# Patient Record
Sex: FEMALE | Race: WHITE | URBAN - METROPOLITAN AREA
[De-identification: names, ages, dates, MRNs, and addresses within clinical notes are randomized per-mention and may not be internally consistent; named-entity substitution may affect disease eponyms.]

---

## 2017-01-23 ENCOUNTER — TELEPHONE (OUTPATIENT)
Dept: FAMILY MEDICINE CLINIC | Facility: CLINIC | Age: 42
End: 2017-01-23

## 2017-01-31 NOTE — TELEPHONE ENCOUNTER
Patient states she has a question regarding her father Delaney Baeza) and would like to know why the cause of death was cerebral hemorrhage?   Please advise

## 2017-01-31 NOTE — TELEPHONE ENCOUNTER
Yana informed that the diagnosis of Nontraumatic intracerebral hemorrhage of cerebellum was used to enter the patient into hospice due to his decline and therefore used as cause of death.     Yana thanked me for my time to look into the information and gi

## 2017-03-27 ENCOUNTER — OFFICE VISIT (OUTPATIENT)
Dept: FAMILY MEDICINE CLINIC | Facility: CLINIC | Age: 42
End: 2017-03-27

## 2017-03-27 VITALS
RESPIRATION RATE: 12 BRPM | WEIGHT: 177 LBS | HEIGHT: 65.5 IN | BODY MASS INDEX: 29.14 KG/M2 | HEART RATE: 60 BPM | DIASTOLIC BLOOD PRESSURE: 70 MMHG | SYSTOLIC BLOOD PRESSURE: 110 MMHG | TEMPERATURE: 99 F

## 2017-03-27 DIAGNOSIS — N30.01 ACUTE CYSTITIS WITH HEMATURIA: Primary | ICD-10-CM

## 2017-03-27 LAB
BILIRUBIN: NEGATIVE
GLUCOSE (URINE DIPSTICK): NEGATIVE MG/DL
KETONES (URINE DIPSTICK): NEGATIVE MG/DL
MULTISTIX LOT#: NORMAL NUMERIC
NITRITE, URINE: NEGATIVE
PH, URINE: 5.5 (ref 4.5–8)
SPECIFIC GRAVITY: 1.02 (ref 1–1.03)
URINE-COLOR: YELLOW
UROBILINOGEN,SEMI-QN: 0.2 MG/DL (ref 0–1.9)

## 2017-03-27 PROCEDURE — 87186 SC STD MICRODIL/AGAR DIL: CPT | Performed by: NURSE PRACTITIONER

## 2017-03-27 PROCEDURE — 87086 URINE CULTURE/COLONY COUNT: CPT | Performed by: NURSE PRACTITIONER

## 2017-03-27 PROCEDURE — 81003 URINALYSIS AUTO W/O SCOPE: CPT | Performed by: NURSE PRACTITIONER

## 2017-03-27 PROCEDURE — 99213 OFFICE O/P EST LOW 20 MIN: CPT | Performed by: NURSE PRACTITIONER

## 2017-03-27 PROCEDURE — 87088 URINE BACTERIA CULTURE: CPT | Performed by: NURSE PRACTITIONER

## 2017-03-27 RX ORDER — SULFAMETHOXAZOLE AND TRIMETHOPRIM 800; 160 MG/1; MG/1
1 TABLET ORAL 2 TIMES DAILY
Qty: 14 TABLET | Refills: 0 | Status: SHIPPED | OUTPATIENT
Start: 2017-03-27 | End: 2017-03-29 | Stop reason: ALTCHOICE

## 2017-03-27 RX ORDER — SPIRONOLACTONE 25 MG/1
1 TABLET ORAL DAILY
COMMUNITY
Start: 2015-09-23 | End: 2017-03-27

## 2017-03-27 RX ORDER — DOXYCYCLINE 40 MG/1
1 CAPSULE ORAL DAILY
COMMUNITY
Start: 2008-04-08

## 2017-03-27 RX ORDER — SPIRONOLACTONE 50 MG/1
50 TABLET, FILM COATED ORAL
Refills: 3 | COMMUNITY
Start: 2017-02-11

## 2017-03-27 RX ORDER — ALBUTEROL SULFATE 90 UG/1
1-2 AEROSOL, METERED RESPIRATORY (INHALATION) EVERY 4 HOURS PRN
COMMUNITY
Start: 2015-10-30 | End: 2017-03-27

## 2017-03-27 NOTE — PATIENT INSTRUCTIONS
Urine culture pending. Continue to push fluids. Encouraged to eat yogurt or take probiotic daily while on antibiotic for prevention of a yeast infection. Cranberry juice may alter the pH and may be helpful to prevent future infections.     Follow-up if s

## 2017-03-27 NOTE — PROGRESS NOTES
HPI:    Patient ID: Lexy Joyce is a 42year old female. HPI     Was in [place]. Had some stomach issues while there and then continued for about a week after returning on the 15th. Urinary symptoms started on 23rd.  Feels like she needs to go, may be helpful to prevent future infections. Follow-up if symptoms worsen or not better in the next 48-72 hours.           Orders Placed This Encounter  Urine Dip, auto without Micro  Urine Culture, Routine [E]    Meds This Visit:  Signed Prescriptions D

## 2017-03-29 ENCOUNTER — TELEPHONE (OUTPATIENT)
Dept: FAMILY MEDICINE CLINIC | Facility: CLINIC | Age: 42
End: 2017-03-29

## 2017-03-29 RX ORDER — CEPHALEXIN 500 MG/1
500 CAPSULE ORAL 3 TIMES DAILY
Qty: 30 CAPSULE | Refills: 0 | Status: SHIPPED | OUTPATIENT
Start: 2017-03-29 | End: 2017-04-08

## 2017-03-29 NOTE — PROGRESS NOTES
Was unable to complete the previous append to labs. Spoke with patient. PCN reaction was rash. Will change to Keflex at [facility] pharmacy and stop the bactrim. Patient states understanding.  Lucie Angeles, 03/29/2017, 8:51 AM

## 2017-03-29 NOTE — TELEPHONE ENCOUNTER
Spoke with patient. She wanted clarification on the type of E. coli. Complained it was not clarified to a type of E. coli. Did also explain that it is a common bacteria causing the source of urinary tract infections.   Also explained again that the María Dewitt

## 2017-05-04 ENCOUNTER — TELEPHONE (OUTPATIENT)
Dept: FAMILY MEDICINE CLINIC | Facility: CLINIC | Age: 42
End: 2017-05-04

## 2017-05-04 ENCOUNTER — OFFICE VISIT (OUTPATIENT)
Dept: FAMILY MEDICINE CLINIC | Facility: CLINIC | Age: 42
End: 2017-05-04

## 2017-05-04 VITALS
WEIGHT: 174.13 LBS | HEIGHT: 63.5 IN | HEART RATE: 72 BPM | RESPIRATION RATE: 16 BRPM | DIASTOLIC BLOOD PRESSURE: 80 MMHG | TEMPERATURE: 99 F | BODY MASS INDEX: 30.47 KG/M2 | SYSTOLIC BLOOD PRESSURE: 132 MMHG

## 2017-05-04 DIAGNOSIS — R35.0 URINARY FREQUENCY: Primary | ICD-10-CM

## 2017-05-04 PROCEDURE — 87086 URINE CULTURE/COLONY COUNT: CPT | Performed by: FAMILY MEDICINE

## 2017-05-04 PROCEDURE — 81003 URINALYSIS AUTO W/O SCOPE: CPT | Performed by: FAMILY MEDICINE

## 2017-05-04 PROCEDURE — 99213 OFFICE O/P EST LOW 20 MIN: CPT | Performed by: FAMILY MEDICINE

## 2017-05-04 RX ORDER — NITROFURANTOIN MACROCRYSTALS 100 MG/1
100 CAPSULE ORAL 2 TIMES DAILY
Qty: 20 CAPSULE | Refills: 0 | Status: SHIPPED | OUTPATIENT
Start: 2017-05-04 | End: 2017-11-25 | Stop reason: ALTCHOICE

## 2017-05-04 RX ORDER — SULFAMETHOXAZOLE AND TRIMETHOPRIM 800; 160 MG/1; MG/1
1 TABLET ORAL 2 TIMES DAILY
COMMUNITY
End: 2017-11-25 | Stop reason: ALTCHOICE

## 2017-05-04 NOTE — TELEPHONE ENCOUNTER
Patient states she still feels like she has a urinary tract infection. States she was recently diagnosed with one and had E. Coli. Patient states she is having urinary urgency but not having a lot of output when she goes.   Patient denies having any dysur

## 2017-05-04 NOTE — PATIENT INSTRUCTIONS
Urine culture done today. Macrobid given.   If no improvement then would need further evaluation and possibly seeing urologist.

## 2017-05-04 NOTE — TELEPHONE ENCOUNTER
Patient informed of the below recommendations. Appt scheduled this afternoon at 3:45pm with Dr. Shelton Lainez.  Thang Hager, 05/04/2017, 10:14 AM

## 2017-05-04 NOTE — PROGRESS NOTES
[facility] [place]  PROGRESS NOTE  Chief Complaint:   Patient presents with:  Urinary Frequency      HPI:   This is a 42year old female coming in for urinary frequency.   Patient was seen recently for urinary frequency and noted to have an abnor Grandmother    • Diabetes Paternal Grandfather    • Diabetes Sister      Allergies:    Penicillin G            Hives  Current Meds:    Current Outpatient Prescriptions:  Sulfamethoxazole-TMP DS (BACTRIM DS) 800-160 MG Oral Tab per tablet Take 1 tablet by m Macrobid. If no improvement with this and if culture is negative then would need to see urology a.     Health Maintenance:  Annual Physical due on 09/05/1976  Pap Smear,3 Years due on 09/05/2005  Mammogram,1 Yr due on 09/05/2014    Patient/Caregiver Katiuska Cruz

## 2017-05-06 ENCOUNTER — TELEPHONE (OUTPATIENT)
Dept: FAMILY MEDICINE CLINIC | Facility: CLINIC | Age: 42
End: 2017-05-06

## 2017-05-06 NOTE — TELEPHONE ENCOUNTER
----- Message from Eulis Nissen, MD sent at 5/6/2017  8:19 AM CDT -----  Urine culture is essentially negative. Notify pt.   If no improvement in symptoms with Macrobid then may need to see urologist.  Notify pt

## 2017-05-06 NOTE — TELEPHONE ENCOUNTER
Informed pt of her UA results. Pt is feeling better on Macobid and will finish med. Pt will also drink cranberry juice to help.  Pt expressed understanding and thanks if sxs return pt will call for Urology referral.

## 2017-11-25 ENCOUNTER — OFFICE VISIT (OUTPATIENT)
Dept: FAMILY MEDICINE CLINIC | Facility: CLINIC | Age: 42
End: 2017-11-25

## 2017-11-25 VITALS
DIASTOLIC BLOOD PRESSURE: 62 MMHG | OXYGEN SATURATION: 97 % | HEART RATE: 104 BPM | TEMPERATURE: 98 F | RESPIRATION RATE: 14 BRPM | SYSTOLIC BLOOD PRESSURE: 128 MMHG | BODY MASS INDEX: 27.71 KG/M2 | HEIGHT: 63.5 IN | WEIGHT: 158.38 LBS

## 2017-11-25 DIAGNOSIS — J06.9 VIRAL UPPER RESPIRATORY TRACT INFECTION: Primary | ICD-10-CM

## 2017-11-25 DIAGNOSIS — J01.90 ACUTE NON-RECURRENT SINUSITIS, UNSPECIFIED LOCATION: ICD-10-CM

## 2017-11-25 PROCEDURE — 99214 OFFICE O/P EST MOD 30 MIN: CPT | Performed by: FAMILY MEDICINE

## 2017-11-25 RX ORDER — AZITHROMYCIN 250 MG/1
TABLET, FILM COATED ORAL
Qty: 6 TABLET | Refills: 0 | Status: SHIPPED | OUTPATIENT
Start: 2017-11-25 | End: 2018-11-27 | Stop reason: ALTCHOICE

## 2017-11-25 NOTE — PATIENT INSTRUCTIONS
Rest, fluids, hydrate,   Use mucinex for nasal congestin and cough. Vitamin c, zinc, and garlic to help your immune system. Tylenol and ibuprofen as appropriate. Call if not improving in 10-14 days to be re-seen.   Saline nasal spray or nasal rinses w

## 2017-11-25 NOTE — PROGRESS NOTES
CHIEF COMPLAINT:   Patient presents with:  Cough: Cough, mucus, and sore throat      HPI:   Arsenio Jordan is a 36year old female who presents to clinic today with complaints of  Jina Antonio for a week, sore throat,  Sinus congestion.    cough- green sputum o erythema. No exudates. NECK: supple, non-tender  THYROID: no palpable mass or tenderness  LUNGS: clear to auscultation bilaterally, no wheezes or rhonchi. Breathing is non labored.   CARDIO: RRR without murmur  EXTREMITIES: no cyanosis, clubbing or edema

## 2018-11-26 ENCOUNTER — TELEPHONE (OUTPATIENT)
Dept: FAMILY MEDICINE CLINIC | Facility: CLINIC | Age: 43
End: 2018-11-26

## 2018-11-26 NOTE — TELEPHONE ENCOUNTER
Patient wanted to come in this evening, told patient we did not have appointments available for this evening.  Patient thinks she has a UTI and requested a call back

## 2018-11-27 ENCOUNTER — OFFICE VISIT (OUTPATIENT)
Dept: FAMILY MEDICINE CLINIC | Facility: CLINIC | Age: 43
End: 2018-11-27
Payer: COMMERCIAL

## 2018-11-27 VITALS
HEART RATE: 60 BPM | RESPIRATION RATE: 16 BRPM | HEIGHT: 63.75 IN | TEMPERATURE: 98 F | WEIGHT: 173.19 LBS | SYSTOLIC BLOOD PRESSURE: 112 MMHG | BODY MASS INDEX: 29.93 KG/M2 | DIASTOLIC BLOOD PRESSURE: 62 MMHG | OXYGEN SATURATION: 98 %

## 2018-11-27 DIAGNOSIS — R35.0 URINARY FREQUENCY: Primary | ICD-10-CM

## 2018-11-27 DIAGNOSIS — N30.00 ACUTE CYSTITIS WITHOUT HEMATURIA: ICD-10-CM

## 2018-11-27 PROCEDURE — 87077 CULTURE AEROBIC IDENTIFY: CPT | Performed by: FAMILY MEDICINE

## 2018-11-27 PROCEDURE — 87086 URINE CULTURE/COLONY COUNT: CPT | Performed by: FAMILY MEDICINE

## 2018-11-27 PROCEDURE — 81001 URINALYSIS AUTO W/SCOPE: CPT | Performed by: FAMILY MEDICINE

## 2018-11-27 PROCEDURE — 99214 OFFICE O/P EST MOD 30 MIN: CPT | Performed by: FAMILY MEDICINE

## 2018-11-27 PROCEDURE — 81003 URINALYSIS AUTO W/O SCOPE: CPT | Performed by: FAMILY MEDICINE

## 2018-11-27 RX ORDER — LEVOFLOXACIN 500 MG/1
500 TABLET, FILM COATED ORAL DAILY
Qty: 10 TABLET | Refills: 0 | Status: SHIPPED | OUTPATIENT
Start: 2018-11-27 | End: 2018-12-07

## 2018-11-27 NOTE — PATIENT INSTRUCTIONS
Advised to increase fluid intake. Call if increasing pain or vomitting. Cranberry concentrate to help change PH to decrease infection risk. Void after intercourse to reduce risk of UTI recurrence.    Follow up Urinalysis and Culture in 10 days to assur

## 2018-11-27 NOTE — PROGRESS NOTES
[facility] [place]  PROGRESS NOTE  Chief Complaint:   Patient presents with:  Urinary Frequency      HPI:   This is a 39year old female coming in for    Pt symptoms October-- video appt.   Did home test with AZO  Treated 5 days macrobid for 5 da oz  11/25/17 : 158 lb 6 oz  05/04/17 : 174 lb 2 oz  03/27/17 : 177 lb    History reviewed. No pertinent past medical history. History reviewed. No pertinent surgical history.   Social History:  Social History    Socioeconomic History      Marital status: S sputum. GASTROINTESTINAL:  Denies abdominal pain, nausea, vomiting, constipation, diarrhea, or blood in stool. MUSCULOSKELETAL:  Denies weakness, muscle aches, back pain, joint pain, swelling or stiffness.   NEUROLOGICAL:  Denies headache, seizures, dizzi CULTURE, ROUTINE; Future  - URINALYSIS, ROUTINE  - URINE CULTURE, ROUTINE      Problem List:  Patient Active Problem List:     Hearing loss      Patient Instructions   Advised to increase fluid intake. Call if increasing pain or vomitting.    Cranberry co

## 2018-11-28 ENCOUNTER — TELEPHONE (OUTPATIENT)
Dept: FAMILY MEDICINE CLINIC | Facility: CLINIC | Age: 43
End: 2018-11-28

## 2018-11-29 NOTE — TELEPHONE ENCOUNTER
----- Message from Merlinda Deacon, MD sent at 11/28/2018  6:49 PM CST -----  Urine culture with staph aureus. Patient should be able to stop the Levaquin after 3 days therapy.   If she has further issues I would recommend that she return for a recheck a

## 2018-12-15 ENCOUNTER — OFFICE VISIT (OUTPATIENT)
Dept: FAMILY MEDICINE CLINIC | Facility: CLINIC | Age: 43
End: 2018-12-15
Payer: COMMERCIAL

## 2018-12-15 VITALS
RESPIRATION RATE: 16 BRPM | TEMPERATURE: 98 F | BODY MASS INDEX: 30.52 KG/M2 | DIASTOLIC BLOOD PRESSURE: 60 MMHG | HEART RATE: 78 BPM | SYSTOLIC BLOOD PRESSURE: 108 MMHG | OXYGEN SATURATION: 98 % | HEIGHT: 63.5 IN | WEIGHT: 174.38 LBS

## 2018-12-15 DIAGNOSIS — L02.92 BOILS: ICD-10-CM

## 2018-12-15 DIAGNOSIS — R35.0 URINARY FREQUENCY: Primary | ICD-10-CM

## 2018-12-15 DIAGNOSIS — Z00.00 ANNUAL PHYSICAL EXAM: ICD-10-CM

## 2018-12-15 PROCEDURE — 87086 URINE CULTURE/COLONY COUNT: CPT | Performed by: FAMILY MEDICINE

## 2018-12-15 PROCEDURE — 88175 CYTOPATH C/V AUTO FLUID REDO: CPT | Performed by: FAMILY MEDICINE

## 2018-12-15 PROCEDURE — 81003 URINALYSIS AUTO W/O SCOPE: CPT | Performed by: FAMILY MEDICINE

## 2018-12-15 PROCEDURE — 99214 OFFICE O/P EST MOD 30 MIN: CPT | Performed by: FAMILY MEDICINE

## 2018-12-15 PROCEDURE — 87624 HPV HI-RISK TYP POOLED RSLT: CPT | Performed by: FAMILY MEDICINE

## 2018-12-15 RX ORDER — DOXYCYCLINE HYCLATE 100 MG/1
100 CAPSULE ORAL 2 TIMES DAILY
Qty: 20 CAPSULE | Refills: 0 | Status: SHIPPED | OUTPATIENT
Start: 2018-12-15

## 2018-12-15 NOTE — PATIENT INSTRUCTIONS
Pt rx doxycycline  For boils    Pt to consider urology eval if continued issues  Urine culture today

## 2018-12-15 NOTE — PROGRESS NOTES
[facility] [place]  PROGRESS NOTE  Chief Complaint:   Patient presents with:  Urinary Frequency: Was recently treated for UTI, still having urinary frequency      HPI:   This is a 39year old female coming in for complaints of urine frequency Spouse name: Not on file      Number of children: Not on file      Years of education: Not on file      Highest education level: Not on file    Tobacco Use      Smoking status: Former Smoker        Quit date: 2012        Years since quittin.9 paralysis, ataxia, numbness or tingling in the extremities  HEMATOLOGIC:  Denies anemia, bleeding or bruising. LYMPHATICS:  Denies enlarged nodes  PSYCHIATRIC:  Denies depression or anxiety.   ENDOCRINOLOGIC:  Denies excessive sweating, cold or heat intole Annual physical exam  Pap today  - THINPREP PAP SMEAR B; Future  - HPV HIGH RISK , THIN PREP COLLECTION; Future  - THINPREP PAP SMEAR B  - HPV HIGH RISK , THIN PREP COLLECTION    3.  Boils  course antibiotics, monitor and f.u as needed    - THINPREP PAP SME

## 2018-12-18 ENCOUNTER — TELEPHONE (OUTPATIENT)
Dept: FAMILY MEDICINE CLINIC | Facility: CLINIC | Age: 43
End: 2018-12-18

## 2018-12-18 NOTE — TELEPHONE ENCOUNTER
----- Message from Vero Antonio MD sent at 12/18/2018  3:23 PM CST -----  Negative urine and urine culture. Negative HPV screen. Normal pap . Repeat pap 5 years. Physical advised yearly.

## 2021-01-29 ENCOUNTER — TELEPHONE (OUTPATIENT)
Dept: FAMILY MEDICINE CLINIC | Facility: CLINIC | Age: 46
End: 2021-01-29

## 2021-01-29 NOTE — TELEPHONE ENCOUNTER
pt has appt for monday with dr Frederic Dodd- answered yes to sore throat but states it is related to reason she needs appt, feels like something is in her throat - ok to keep appt?

## 2021-01-29 NOTE — TELEPHONE ENCOUNTER
Pt informed. Pt changed appt to video encounter. Pt gave consent. Merle Peguero  Verbally consents to a Virtual Video Check-In service on 1/29/2021.     Patient understands and accepts financial responsibility for any deductible, co-insurance and/o

## 2021-01-29 NOTE — TELEPHONE ENCOUNTER
Currently, pt scheduled for appt on Monday. Pt states she is coming in due to her throat. Pt states she feels like there is something in her throat. Pt is not sure if it is a sore or her tonsils.   Pt states whatever it is, it seems to be producing mucou

## (undated) NOTE — MR AVS SNAPSHOT
[address]  [address] [id number]  [phone]               Thank you for choosing us for your health care visit with Maddy Fay MD.  We are glad to serve you and happy to provide you with this summary of Where to Get Your Medications      These medications were sent to [facility] #[id number]- [place] - [address], [address], [address]     Phone:  [phone]    - Nitrofurantoin Macrocrystal 10 Start activities slowly and build up over time Do what you like   Get your heart pumping – brisk walking, biking, swimming Even 10 minute increments are effective and add up over the week   2 ½ hours per week – spread out over time Use a joe to keep you